# Patient Record
Sex: MALE | Race: WHITE | NOT HISPANIC OR LATINO | URBAN - METROPOLITAN AREA
[De-identification: names, ages, dates, MRNs, and addresses within clinical notes are randomized per-mention and may not be internally consistent; named-entity substitution may affect disease eponyms.]

---

## 2020-09-10 ENCOUNTER — EMERGENCY (EMERGENCY)
Facility: HOSPITAL | Age: 30
LOS: 1 days | Discharge: HOME | End: 2020-09-10
Attending: EMERGENCY MEDICINE | Admitting: EMERGENCY MEDICINE
Payer: COMMERCIAL

## 2020-09-10 VITALS
OXYGEN SATURATION: 99 % | HEART RATE: 94 BPM | WEIGHT: 117.07 LBS | TEMPERATURE: 98 F | RESPIRATION RATE: 16 BRPM | DIASTOLIC BLOOD PRESSURE: 78 MMHG | SYSTOLIC BLOOD PRESSURE: 142 MMHG

## 2020-09-10 LAB
BASOPHILS # BLD AUTO: 0.03 K/UL — SIGNIFICANT CHANGE UP (ref 0–0.2)
BASOPHILS NFR BLD AUTO: 0.4 % — SIGNIFICANT CHANGE UP (ref 0–2)
EOSINOPHIL # BLD AUTO: 0.14 K/UL — SIGNIFICANT CHANGE UP (ref 0–0.5)
EOSINOPHIL NFR BLD AUTO: 1.8 % — SIGNIFICANT CHANGE UP (ref 0–6)
HCT VFR BLD CALC: 40.9 % — SIGNIFICANT CHANGE UP (ref 39–50)
HGB BLD-MCNC: 13.7 G/DL — SIGNIFICANT CHANGE UP (ref 13–17)
IMM GRANULOCYTES NFR BLD AUTO: 0.5 % — SIGNIFICANT CHANGE UP (ref 0–1.5)
LYMPHOCYTES # BLD AUTO: 1.86 K/UL — SIGNIFICANT CHANGE UP (ref 1–3.3)
LYMPHOCYTES # BLD AUTO: 24.5 % — SIGNIFICANT CHANGE UP (ref 13–44)
MCHC RBC-ENTMCNC: 31.4 PG — SIGNIFICANT CHANGE UP (ref 27–34)
MCHC RBC-ENTMCNC: 33.5 GM/DL — SIGNIFICANT CHANGE UP (ref 32–36)
MCV RBC AUTO: 93.8 FL — SIGNIFICANT CHANGE UP (ref 80–100)
MONOCYTES # BLD AUTO: 0.78 K/UL — SIGNIFICANT CHANGE UP (ref 0–0.9)
MONOCYTES NFR BLD AUTO: 10.3 % — SIGNIFICANT CHANGE UP (ref 2–14)
NEUTROPHILS # BLD AUTO: 4.73 K/UL — SIGNIFICANT CHANGE UP (ref 1.8–7.4)
NEUTROPHILS NFR BLD AUTO: 62.5 % — SIGNIFICANT CHANGE UP (ref 43–77)
NRBC # BLD: 0 /100 WBCS — SIGNIFICANT CHANGE UP (ref 0–0)
PLATELET # BLD AUTO: 172 K/UL — SIGNIFICANT CHANGE UP (ref 150–400)
RBC # BLD: 4.36 M/UL — SIGNIFICANT CHANGE UP (ref 4.2–5.8)
RBC # FLD: 12.6 % — SIGNIFICANT CHANGE UP (ref 10.3–14.5)
WBC # BLD: 7.58 K/UL — SIGNIFICANT CHANGE UP (ref 3.8–10.5)
WBC # FLD AUTO: 7.58 K/UL — SIGNIFICANT CHANGE UP (ref 3.8–10.5)

## 2020-09-10 PROCEDURE — 99285 EMERGENCY DEPT VISIT HI MDM: CPT | Mod: 25

## 2020-09-10 PROCEDURE — 71046 X-RAY EXAM CHEST 2 VIEWS: CPT | Mod: 26

## 2020-09-10 RX ORDER — SODIUM CHLORIDE 9 MG/ML
1000 INJECTION INTRAMUSCULAR; INTRAVENOUS; SUBCUTANEOUS ONCE
Refills: 0 | Status: COMPLETED | OUTPATIENT
Start: 2020-09-10 | End: 2020-09-10

## 2020-09-10 RX ADMIN — SODIUM CHLORIDE 1000 MILLILITER(S): 9 INJECTION INTRAMUSCULAR; INTRAVENOUS; SUBCUTANEOUS at 23:48

## 2020-09-10 NOTE — ED ADULT NURSE NOTE - CHPI ED NUR SYMPTOMS NEG
no back pain/no nausea/no chills/no diaphoresis/no dizziness/no syncope/no chest pain/no congestion/no vomiting/no fever

## 2020-09-10 NOTE — ED ADULT NURSE NOTE - OBJECTIVE STATEMENT
pt is 29y male, here for chest palpitations and SOB that started this evening after smoking weed, denies any CP/dizziness/n/v/diaphoresis, also denies any hx of similar sx when smoking weed in the past, symptoms resolved spontaneously after 10 minutes but pt was seen in urgent care and referred to ED, pt ia A&Ox3, ambulates with steady gait, normal heart and lung sounds, NAD noted

## 2020-09-11 VITALS
HEART RATE: 91 BPM | RESPIRATION RATE: 18 BRPM | OXYGEN SATURATION: 100 % | TEMPERATURE: 97 F | DIASTOLIC BLOOD PRESSURE: 81 MMHG | SYSTOLIC BLOOD PRESSURE: 124 MMHG

## 2020-09-11 LAB
ALBUMIN SERPL ELPH-MCNC: 4.4 G/DL — SIGNIFICANT CHANGE UP (ref 3.3–5)
ALP SERPL-CCNC: 53 U/L — SIGNIFICANT CHANGE UP (ref 40–120)
ALT FLD-CCNC: 13 U/L — SIGNIFICANT CHANGE UP (ref 10–45)
ANION GAP SERPL CALC-SCNC: 10 MMOL/L — SIGNIFICANT CHANGE UP (ref 5–17)
AST SERPL-CCNC: 21 U/L — SIGNIFICANT CHANGE UP (ref 10–40)
BILIRUB SERPL-MCNC: 0.5 MG/DL — SIGNIFICANT CHANGE UP (ref 0.2–1.2)
BUN SERPL-MCNC: 20 MG/DL — SIGNIFICANT CHANGE UP (ref 7–23)
CALCIUM SERPL-MCNC: 9.1 MG/DL — SIGNIFICANT CHANGE UP (ref 8.4–10.5)
CHLORIDE SERPL-SCNC: 105 MMOL/L — SIGNIFICANT CHANGE UP (ref 96–108)
CO2 SERPL-SCNC: 25 MMOL/L — SIGNIFICANT CHANGE UP (ref 22–31)
CREAT SERPL-MCNC: 0.98 MG/DL — SIGNIFICANT CHANGE UP (ref 0.5–1.3)
D DIMER BLD IA.RAPID-MCNC: <150 NG/ML DDU — SIGNIFICANT CHANGE UP
GLUCOSE SERPL-MCNC: 139 MG/DL — HIGH (ref 70–99)
POTASSIUM SERPL-MCNC: 4.2 MMOL/L — SIGNIFICANT CHANGE UP (ref 3.5–5.3)
POTASSIUM SERPL-SCNC: 4.2 MMOL/L — SIGNIFICANT CHANGE UP (ref 3.5–5.3)
PROT SERPL-MCNC: 7.1 G/DL — SIGNIFICANT CHANGE UP (ref 6–8.3)
SODIUM SERPL-SCNC: 140 MMOL/L — SIGNIFICANT CHANGE UP (ref 135–145)
TROPONIN T SERPL-MCNC: <0.01 NG/ML — SIGNIFICANT CHANGE UP (ref 0–0.01)

## 2020-09-11 PROCEDURE — 36415 COLL VENOUS BLD VENIPUNCTURE: CPT

## 2020-09-11 PROCEDURE — 85379 FIBRIN DEGRADATION QUANT: CPT

## 2020-09-11 PROCEDURE — 80053 COMPREHEN METABOLIC PANEL: CPT

## 2020-09-11 PROCEDURE — 84484 ASSAY OF TROPONIN QUANT: CPT

## 2020-09-11 PROCEDURE — 71046 X-RAY EXAM CHEST 2 VIEWS: CPT

## 2020-09-11 PROCEDURE — 85025 COMPLETE CBC W/AUTO DIFF WBC: CPT

## 2020-09-11 PROCEDURE — 99284 EMERGENCY DEPT VISIT MOD MDM: CPT | Mod: 25

## 2020-09-11 PROCEDURE — 71046 X-RAY EXAM CHEST 2 VIEWS: CPT | Mod: 26

## 2020-09-11 NOTE — ED PROVIDER NOTE - CLINICAL SUMMARY MEDICAL DECISION MAKING FREE TEXT BOX
In summary, this is a 28yo male with PMHx significant for chronic marijuana use (x3-4/wk), FMHx in brother of unknown cardiac pathology referred to [facility] ED from [facility] for 1 episode of palpitations x5min c/w drug-induced or idiopathic palpitations.  Low suspicion for PE, acute MI, or alternate emergent cardiopulm etiology.  EKG confirms RSR V1-V2, otherwise NSR, regular rate, normal intervals, no ST elevations/depressions/T wave inversions, no new axis deviation/BBB/heart block.  PERC = 0, D-dimer ordered for final r/o of PE.  CXR ordered as well.  S/p 1L NS bolus, patient encouraged to make appt with a PCP and cards given family cardiac hx, otherwise stable and ready for discharge when all results return and confirm rule-out of emergent etiologies.  Patient agreeable to plan.  Further consultation/management as appropriate. In summary, this is a 28yo male with PMHx significant for chronic marijuana use (x3-4/wk), FMHx in brother of unknown cardiac pathology referred to [facility] ED from [facility] for 1 episode of palpitations x5min c/w drug-induced or idiopathic palpitations.  Low suspicion for PE, acute MI, or alternate emergent cardiopulm etiology.  Initial [facility] EKG and repeat at [facility] ED confirm non-emergent RSR V1-V2, otherwise NSR, regular rate, normal intervals, no ST elevations/depressions/T wave inversions, no new axis deviation/BBB/heart block.  PERC = 0, D-dimer ordered for final r/o of PE.  CXR ordered as well.  S/p 1L NS bolus, patient encouraged to make appt with a PCP and cards given family cardiac hx, otherwise stable and ready for discharge when all results return and confirm rule-out of emergent etiologies.  Patient agreeable to plan.  Further consultation/management as appropriate. In summary, this is a 28yo male with PMHx significant for chronic marijuana use (x3-4/wk), FMHx in brother of unknown cardiac pathology referred to [facility] ED from [facility] for 1 episode of palpitations x5min c/w drug-induced or idiopathic palpitations.  Low suspicion for PE, acute MI, or alternate emergent cardiopulm etiology.  Initial [facility] EKG and repeat at [facility] ED confirm non-emergent RSR V1-V2, otherwise NSR, regular rate, normal intervals, no ST elevations/depressions/T wave inversions, no new axis deviation/BBB/heart block.  Low PE risk per Wells Criteria, PERC = 0, D-dimer ordered for final r/o of PE.  CXR ordered as well.  Labs returned thus far wnl and trops neg.  S/p 1L NS bolus, patient encouraged to make appt with a PCP and cards given family cardiac hx, otherwise stable and ready for discharge when all results return and confirm rule-out of emergent etiologies.  Patient agreeable to plan.  Further consultation/management as appropriate.

## 2020-09-11 NOTE — ED PROVIDER NOTE - PROGRESS NOTE DETAILS
no further palpitations while in ED, no tachycardia, ddimer negative, doubt pe. recommend f/u with PMD, avoid marijuana  I have discussed the discharge plan with the patient. The patient agrees with the plan, as discussed.  The patient understands Emergency Department diagnosis is a preliminary diagnosis often based on limited information and that the patient must adhere to the follow-up plan as discussed.  The patient understands that if the symptoms worsen  the patient may return to the Emergency Department at any time for further evaluation and treatment.

## 2020-09-11 NOTE — ED PROVIDER NOTE - ATTENDING CONTRIBUTION TO CARE
29M no PMH c/o palpitations. pt states started while he was at rest. had smoked marijuana prior. no chest pain. no SOB. no n/v/d. no recent travel. no dizziness. no LE swelling. no sick contacts. was seen at  and advised to come to ED.  gen- nad  heent- ncat, clear conj  cv -rrr  lungs -ctab  abd - soft, nt, nd  ext -wwp, no edema  neuro -aox3, steady gait, harrison  no tachycardia, no acute st/t wave changes on EKG, no infiltrate on CXR, no SOB, doubt PE, doubt ACS. given ivf. recommend f/u with PMD

## 2020-09-11 NOTE — ED PROVIDER NOTE - PHYSICAL EXAMINATION
General Appearance: AAOx3, patient appears well and stated age, NAD  HEENT: NC/AT, anicteric sclerae, EOMI, aberrant smooth pursuit, PERRLA, no conjunctival pallor, clear TMs B/L, oropharynx clear, normal oral mucosa, no LAD  Cardiac: RRR, S1/S2, no murmurs/rubs/gallops/heaves, +2 radial/DP/PT pulses B/L  Pulmonary: Chest normal to inspection, CTAB without wheezes/rales/rhonchi, good inspiratory effort  Gastrointestinal: Abdomen soft, ND/NT, +BS all four quadrants, no rigidity/guarding, no CVAT, no organomegaly  Genitourinary: Deferred by patient  Rectal: Normal external exam without lesions, skin tags, fissures, internal exam demonstrated no hemorrhoids, no stool impaction, non-bloody/non-melanotic stool appreciated  Neuro/MSK: AAOx3, CN II-XII intact, no focal neuromuscular deficits, no sensory extinction, no motor weakness, +5/5 strength in all muscle groups, all joints/extremities FROM  Psychiatric: AAOx3, euthymic mood appropriate to situation, affect stable/full/reactive, denies a/v/tactile hallucinations, denies SI/HI at this time  Skin: Skin intact, no jaundice, no apparent lesions, petechiae/purpura/ecchymoses, rashes, burns General Appearance: AAOx3, patient appears well and stated age, NAD  HEENT: NC/AT, anicteric sclerae, EOMI, aberrant smooth pursuit, PERRLA, no conjunctival pallor, clear TMs B/L, oropharynx clear, normal oral mucosa, no LAD  Cardiac: RRR, S1/S2, no murmurs/rubs/gallops/heaves, +2 radial/DP/PT pulses B/L  Pulmonary: Chest normal to inspection, CTAB without wheezes/rales/rhonchi, good inspiratory effort  Gastrointestinal: Abdomen soft, ND/NT, +BS all four quadrants, no rigidity/guarding, no CVAT, no organomegaly  Genitourinary: Deferred by patient  Rectal: Deferred by patient  Neuro/MSK: AAOx3, CN II-XII intact, no focal neuromuscular deficits, no sensory extinction, no motor weakness, +5/5 strength in all muscle groups, all joints/extremities FROM  Psychiatric: AAOx3, euthymic mood appropriate to situation, affect stable/full/reactive, denies a/v/tactile hallucinations, denies SI/HI at this time  Skin: Skin intact, no jaundice, no apparent lesions, petechiae/purpura/ecchymoses, rashes, burns

## 2020-09-11 NOTE — ED PROVIDER NOTE - OBJECTIVE STATEMENT
Patient is a 30yo male with PMHx significant for chronic marijuana use (x3-4/wk), FMHx in brother of unknown cardiac pathology referred to [facility] ED from [facility] for 1 episode of palpitations x5min.  Patient states that he was sitting at home playing on his phone smoking marijuana when he began to experience a brief episode of chest palpitations with some associated anxiety.  He walked outside to calm himself down, went back inside, and then walked over to [facility] near his apartment to be evaluated.  Initial EKG demonstrates borderline sinus tachycardia with RSR in leads V1-V2, referred to ED for r/o PE.  Patient endorses occasional panic attacks x1-2 per year, none recently, denies severe adverse reactions to marijuana use in the past, denies chest pain/back/abd pain, SOB, cough, hemoptysis, N/V/D, hematemesis, dizziness/syncope/falls/LoC/head or other trauma.  Endorses some brief tingling in fingers and toes during this episode with immediate resolution, no other focal neuromuscular deficits/findings, denies fevers, chills/night sweats, recent weight change, change in urine/stool frequency/color, hematuria/hematochezia/melena.  Denies EtOH, tobacco, or recreational drug use aside from marijuana, drinks 1-2 cups of coffee per day, no other stimulants, weight loss supplements, OTC meds/alternate remedies.  No known sick contacts, recent travel, significant changes in PO intake or diet. Patient is a 28yo male with PMHx significant for chronic marijuana use (x3-4/wk), FMHx in brother of unknown cardiac pathology referred to [facility] ED from [facility] for 1 episode of palpitations x5min.  Patient states that he was sitting at home playing on his phone smoking marijuana when he began to experience a brief episode of chest palpitations with some associated anxiety.  He walked outside to calm himself down, went back inside, and then walked over to [facility] near his apartment to be evaluated.  Initial EKG demonstrates borderline sinus tachycardia with RSR in leads V1-V2, referred to ED for r/o PE.  Patient endorses occasional panic attacks x1-2 per year, none recently, denies severe adverse reactions to marijuana use in the past, denies chest pain/back/abd pain, calf pain/swelling/discoloration, SOB, cough, hemoptysis, N/V/D, hematemesis, dizziness/syncope/falls/LoC/head or other trauma.  Endorses some brief tingling in fingers and toes during this episode with immediate resolution, no other focal neuromuscular deficits/findings, denies fevers, chills/night sweats, recent weight change, change in urine/stool frequency/color, hematuria/hematochezia/melena.  Denies EtOH, tobacco, or recreational drug use aside from marijuana, drinks 1-2 cups of coffee per day, no other stimulants, weight loss supplements, OTC meds/alternate remedies.  No known sick contacts, recent travel, significant changes in PO intake or diet.

## 2020-09-11 NOTE — ED PROVIDER NOTE - PATIENT PORTAL LINK FT
You can access the FollowMyHealth Patient Portal offered by [facility] by registering at the following website: [email or web address]. By joining [employer]’s FollowMyHealth portal, you will also be able to view your health information using other applications (apps) compatible with our system.

## 2020-09-11 NOTE — ED PROVIDER NOTE - NSFOLLOWUPINSTRUCTIONS_ED_ALL_ED_FT
Heart Palpitations    WHAT YOU NEED TO KNOW:    Heart palpitations are feelings that your heart races, jumps, throbs, or flutters. You may feel extra beats, no beats for a short time, or skipped beats. You may have these feelings in your chest, throat, or neck. They may happen when you are sitting, standing, or lying. Heart palpitations may be frightening, but are usually not caused by a serious problem.     DISCHARGE INSTRUCTIONS:    Call 911 or have someone else call for any of the following:     You have any of the following signs of a heart attack:   Squeezing, pressure, or pain in your chest      You may also have any of the following:   Discomfort or pain in your back, neck, jaw, stomach, or arm      Shortness of breath      Nausea or vomiting      Lightheadedness or a sudden cold sweat      You have any of the following signs of a stroke:   Numbness or drooping on one side of your face       Weakness in an arm or leg      Confusion or difficulty speaking      Dizziness, a severe headache, or vision loss      You faint or lose consciousness.     Return to the emergency department if:     Your palpitations happen more often or get more intense.         Contact your healthcare provider if:     You have new or worsening swelling in your feet or ankles.      You have questions or concerns about your condition or care.    Follow up with your healthcare provider as directed: You may need to follow up with a cardiologist. You may need tests to check for heart problems that cause palpitations. Write down your questions so you remember to ask them during your visits.     Keep a record: Write down when your palpitations start and stop, what you were doing when they started, and your symptoms. Keep track of what you ate or drank within a few hours of your palpitations. Include anything that seemed to help your symptoms, such as lying down or holding your breath. This record will help you and your healthcare provider learn what triggers your palpitations. Bring this record with you to your follow up visits.    Help prevent heart palpitations:     Manage stress and anxiety. Find ways to relax such as listening to music, meditating, or doing yoga. Exercise can also help decrease stress and anxiety. Talk to someone you trust about your stress or anxiety. You can also talk to a therapist.       Get plenty of sleep every night. Ask your healthcare provider how much sleep you need each night.       Do not drink caffeine or alcohol. Caffeine and alcohol can make your palpitations worse. Caffeine is found in soda, coffee, tea, chocolate, and drinks that increase your energy.       Do not smoke. Nicotine and other chemicals in cigarettes and cigars may damage your heart and blood vessels. Ask your healthcare provider for information if you currently smoke and need help to quit. E-cigarettes or smokeless tobacco still contain nicotine. Talk to your healthcare provider before you use these products.       Do not use illegal drugs. Talk to your healthcare provider if you use illegal drugs and want help to quit.

## 2020-09-14 DIAGNOSIS — R00.2 PALPITATIONS: ICD-10-CM
